# Patient Record
Sex: MALE | Race: BLACK OR AFRICAN AMERICAN | NOT HISPANIC OR LATINO | URBAN - METROPOLITAN AREA
[De-identification: names, ages, dates, MRNs, and addresses within clinical notes are randomized per-mention and may not be internally consistent; named-entity substitution may affect disease eponyms.]

---

## 2017-08-15 ENCOUNTER — OUTPATIENT (OUTPATIENT)
Dept: OUTPATIENT SERVICES | Facility: HOSPITAL | Age: 27
LOS: 1 days | End: 2017-08-15
Payer: COMMERCIAL

## 2017-08-15 PROCEDURE — 70491 CT SOFT TISSUE NECK W/DYE: CPT | Mod: 26

## 2017-08-15 PROCEDURE — 70491 CT SOFT TISSUE NECK W/DYE: CPT

## 2017-08-21 ENCOUNTER — OUTPATIENT (OUTPATIENT)
Dept: OUTPATIENT SERVICES | Facility: HOSPITAL | Age: 27
LOS: 1 days | End: 2017-08-21
Payer: COMMERCIAL

## 2017-08-21 PROCEDURE — 76536 US EXAM OF HEAD AND NECK: CPT | Mod: 26

## 2017-08-21 PROCEDURE — 76536 US EXAM OF HEAD AND NECK: CPT

## 2019-06-19 ENCOUNTER — EMERGENCY (EMERGENCY)
Facility: HOSPITAL | Age: 29
LOS: 1 days | Discharge: HOME | End: 2019-06-19
Admitting: EMERGENCY MEDICINE
Payer: COMMERCIAL

## 2019-06-19 VITALS
SYSTOLIC BLOOD PRESSURE: 127 MMHG | HEART RATE: 92 BPM | TEMPERATURE: 98 F | WEIGHT: 179.9 LBS | OXYGEN SATURATION: 100 % | DIASTOLIC BLOOD PRESSURE: 60 MMHG | RESPIRATION RATE: 16 BRPM

## 2019-06-19 DIAGNOSIS — M54.40 LUMBAGO WITH SCIATICA, UNSPECIFIED SIDE: ICD-10-CM

## 2019-06-19 DIAGNOSIS — M54.9 DORSALGIA, UNSPECIFIED: ICD-10-CM

## 2019-06-19 PROCEDURE — 99283 EMERGENCY DEPT VISIT LOW MDM: CPT

## 2019-06-19 PROCEDURE — 96372 THER/PROPH/DIAG INJ SC/IM: CPT

## 2019-06-19 PROCEDURE — 99283 EMERGENCY DEPT VISIT LOW MDM: CPT | Mod: 25

## 2019-06-19 RX ORDER — CYCLOBENZAPRINE HYDROCHLORIDE 10 MG/1
1 TABLET, FILM COATED ORAL
Qty: 15 | Refills: 0
Start: 2019-06-19 | End: 2019-06-23

## 2019-06-19 RX ORDER — IBUPROFEN 200 MG
1 TABLET ORAL
Qty: 30 | Refills: 0
Start: 2019-06-19 | End: 2019-06-28

## 2019-06-19 RX ORDER — ACETAMINOPHEN 500 MG
650 TABLET ORAL ONCE
Refills: 0 | Status: COMPLETED | OUTPATIENT
Start: 2019-06-19 | End: 2019-06-19

## 2019-06-19 RX ORDER — CYCLOBENZAPRINE HYDROCHLORIDE 10 MG/1
10 TABLET, FILM COATED ORAL ONCE
Refills: 0 | Status: COMPLETED | OUTPATIENT
Start: 2019-06-19 | End: 2019-06-19

## 2019-06-19 RX ORDER — LIDOCAINE 4 G/100G
1 CREAM TOPICAL ONCE
Refills: 0 | Status: COMPLETED | OUTPATIENT
Start: 2019-06-19 | End: 2019-06-19

## 2019-06-19 RX ORDER — KETOROLAC TROMETHAMINE 30 MG/ML
30 SYRINGE (ML) INJECTION ONCE
Refills: 0 | Status: DISCONTINUED | OUTPATIENT
Start: 2019-06-19 | End: 2019-06-19

## 2019-06-19 RX ADMIN — Medication 650 MILLIGRAM(S): at 17:30

## 2019-06-19 RX ADMIN — CYCLOBENZAPRINE HYDROCHLORIDE 10 MILLIGRAM(S): 10 TABLET, FILM COATED ORAL at 17:30

## 2019-06-19 RX ADMIN — Medication 30 MILLIGRAM(S): at 17:38

## 2019-06-19 RX ADMIN — LIDOCAINE 1 PATCH: 4 CREAM TOPICAL at 17:31

## 2019-06-19 NOTE — ED PROVIDER NOTE - OBJECTIVE STATEMENT
states that he has been having back pain over the past month. states that he has been evaluated by primary care doctor and [facility]. states that he has pain particularly in the morning. states at times he has tingling that goes to his right knee. states that at times he feels "like my leg is going to give out on me". states no injury or trauma bowel or bladder incontinence, saddle anesthesia. patient's family member is at bedside requesting mri.

## 2019-06-19 NOTE — ED PROVIDER NOTE - CLINICAL SUMMARY MEDICAL DECISION MAKING FREE TEXT BOX
28 year old male presents to the ed with lower back pain over the past month. seen and evaluated by PMD as well as [facility]. has been taking naproxen without relief. physical exam, patient appears well, non-toxic. ttp right sided lumbar paraspinal muscle. patient's family member is at bedside requesting MRI and physician evaluation, Dr. Smith aware. Patient currently does not have any symptoms concerning for cauda equina syndrome such as saddle anesthesia, bowel or bladder incontinence or abscess. Patient does not endorse trauma to the area and therefore do not believe that imaging is warrented at this time. No history of iv drug use or history of cancer. No note of fever on vitals. Believe that pain is most likeley muscular in nature. High sensitivity neurologic exam does not exhibit deficit on physical examination. Patient was advised to avoid driving, operating heavy machinery, or drinking alcohol when taking pain medication and/or muscle relaxers and to avoid heavy lifting and pain exacerbatieng movement. Patient is advised to use heat as well as ice and massage and stay active. Patient was advised to avoid pain exacerbation activities as well as avoid prolonged sitting, standing and bed rest.

## 2019-06-19 NOTE — ED ADULT NURSE NOTE - OBJECTIVE STATEMENT
pt c.o lower back pain, mostly R sided with pain radiation down R leg x 1 month. denies injury/fall. pt admits to being on naprosyn and muscle relaxer, denies relief. awaiting md smith.

## 2019-06-19 NOTE — ED ADULT NURSE NOTE - NSIMPLEMENTINTERV_GEN_ALL_ED
Implemented All Universal Safety Interventions:  [place] to call system. Call bell, personal items and telephone within reach. Instruct patient to call for assistance. Room bathroom lighting operational. Non-slip footwear when patient is off stretcher. Physically safe environment: no spills, clutter or unnecessary equipment. Stretcher in lowest position, wheels locked, appropriate side rails in place.

## 2019-06-19 NOTE — ED PROVIDER NOTE - NSFOLLOWUPINSTRUCTIONS_ED_ALL_ED_FT
please use heat on the area and massage    please take medications as prescribed    please follow up with your primary care doctor    Back pain is very common in adults. The cause of back pain is rarely dangerous and the pain often gets better over time. The cause of your back pain may not be known and may include strain of muscles or ligaments, degeneration of the spinal disks, or arthritis. Occasionally the pain may radiate down your leg(s). Over-the-counter medicines to reduce pain and inflammation are often the most helpful. Stretching and remaining active frequently helps the healing process.     SEEK IMMEDIATE MEDICAL CARE IF YOU HAVE ANY OF THE FOLLOWING SYMPTOMS: bowel or bladder control problems, unusual weakness or numbness in your arms or legs, nausea or vomiting, abdominal pain, fever, dizziness/lightheadedness.

## 2019-06-19 NOTE — ED ADULT TRIAGE NOTE - OTHER COMPLAINTS
lower back pain x1 month, c/o right leg pain. no bowel/urinary incontinence. Ambulatory with steady gait.

## 2019-06-19 NOTE — ED PROVIDER NOTE - PHYSICAL EXAMINATION
General Appearance: Patient is well developed and well nourished. Patient is lying in stretcher, not diaphoretic and does not appear in acute distress.      Pulm: Breath sounds present throughout all lung fields. Chest expansion symmetric bilaterally. No evidence of wheezes, rales, rhonchi or retraction.       Cardio: Regular rate and rhythm. No murmurs, rubs or gallops.    Abdomen: Bowel sounds present all 4 quadrants Soft nontender. no note of pulsatile masses. No rebound or guarding.      MSK: ttp lumbar paraspinal muscle with assocaited spasm. No evidence of edema, erythema or bony deformity on body. Strength 5/5 in arms and legs bilaterally. There is no spinal midline tenderness, step offs or crepitus. ROM of the cervical and thoracic spine.      Neuro: Distal sensation intact in arms and legs bilaterally. Sensory Knee and ankle reflexes 2+ and symmetric bilaterally. gait steady. gcs 15.    psych: mood and affect appropriate.     skin: warm dry and intact- no note of erythema edema purpura petechia ecchymosis

## 2022-09-19 NOTE — ED ADULT NURSE NOTE - MODE OF DISCHARGE
Ambulatory Cephalexin Counseling: I counseled the patient regarding use of cephalexin as an antibiotic for prophylactic and/or therapeutic purposes. Cephalexin (commonly prescribed under brand name Keflex) is a cephalosporin antibiotic which is active against numerous classes of bacteria, including most skin bacteria. Side effects may include nausea, diarrhea, gastrointestinal upset, rash, hives, yeast infections, and in rare cases, hepatitis, kidney disease, seizures, fever, confusion, neurologic symptoms, and others. Patients with severe allergies to penicillin medications are cautioned that there is about a 10% incidence of cross-reactivity with cephalosporins. When possible, patients with penicillin allergies should use alternatives to cephalosporins for antibiotic therapy.